# Patient Record
Sex: MALE | Race: WHITE | NOT HISPANIC OR LATINO | Employment: OTHER | URBAN - METROPOLITAN AREA
[De-identification: names, ages, dates, MRNs, and addresses within clinical notes are randomized per-mention and may not be internally consistent; named-entity substitution may affect disease eponyms.]

---

## 2017-11-20 ENCOUNTER — ESTABLISHED COMPREHENSIVE EXAM (OUTPATIENT)
Dept: URBAN - METROPOLITAN AREA CLINIC 43 | Facility: CLINIC | Age: 72
End: 2017-11-20

## 2017-11-20 DIAGNOSIS — E11.9: ICD-10-CM

## 2017-11-20 DIAGNOSIS — H44.22: ICD-10-CM

## 2017-11-20 DIAGNOSIS — H52.203: ICD-10-CM

## 2017-11-20 DIAGNOSIS — H52.11: ICD-10-CM

## 2017-11-20 PROCEDURE — 92014 COMPRE OPH EXAM EST PT 1/>: CPT

## 2017-11-20 PROCEDURE — 92310-1 LEVEL 1 CONTACT LENS MANAGEMENT

## 2017-11-20 PROCEDURE — 92015 DETERMINE REFRACTIVE STATE: CPT

## 2017-11-20 ASSESSMENT — VISUAL ACUITY
OS_SC: J1+
OD_CC: 20/40-1
OS_CC: 20/50-2
OD_CC: J1+
OS_CC: J1
OD_SC: J1+

## 2017-11-20 ASSESSMENT — TONOMETRY
OS_IOP_MMHG: 16
OD_IOP_MMHG: 17

## 2019-01-11 ENCOUNTER — ESTABLISHED COMPREHENSIVE EXAM (OUTPATIENT)
Dept: URBAN - METROPOLITAN AREA CLINIC 43 | Facility: CLINIC | Age: 74
End: 2019-01-11

## 2019-01-11 DIAGNOSIS — Z46.0: ICD-10-CM

## 2019-01-11 DIAGNOSIS — H52.203: ICD-10-CM

## 2019-01-11 DIAGNOSIS — E11.9: ICD-10-CM

## 2019-01-11 DIAGNOSIS — H52.12: ICD-10-CM

## 2019-01-11 DIAGNOSIS — H52.11: ICD-10-CM

## 2019-01-11 PROCEDURE — 92015 DETERMINE REFRACTIVE STATE: CPT

## 2019-01-11 PROCEDURE — 92014 COMPRE OPH EXAM EST PT 1/>: CPT

## 2019-01-11 PROCEDURE — 92310-2 LEVEL 2 CONTACT LENS MANAGEMENT

## 2019-01-11 ASSESSMENT — VISUAL ACUITY
OS_BAT: 20/400
OD_CC: 20/50-2
OD_CC: J1
OS_SC: J12
OS_CC: 20/50-1
OD_SC: J8
OD_BAT: 20/400
OS_CC: J3

## 2019-02-12 ENCOUNTER — CATARACT CONSULT (OUTPATIENT)
Dept: URBAN - METROPOLITAN AREA CLINIC 43 | Facility: CLINIC | Age: 74
End: 2019-02-12

## 2019-02-12 VITALS
HEART RATE: 84 BPM | DIASTOLIC BLOOD PRESSURE: 76 MMHG | HEIGHT: 60 IN | RESPIRATION RATE: 14 BRPM | SYSTOLIC BLOOD PRESSURE: 142 MMHG

## 2019-02-12 DIAGNOSIS — H43.813: ICD-10-CM

## 2019-02-12 DIAGNOSIS — H35.372: ICD-10-CM

## 2019-02-12 DIAGNOSIS — H25.811: ICD-10-CM

## 2019-02-12 DIAGNOSIS — H25.812: ICD-10-CM

## 2019-02-12 DIAGNOSIS — E11.9: ICD-10-CM

## 2019-02-12 PROCEDURE — 92136TC INTERFEROMETRY - TECHNICAL COMPONENT

## 2019-02-12 PROCEDURE — 99214 OFFICE O/P EST MOD 30 MIN: CPT

## 2019-02-12 PROCEDURE — 92134 CPTRZ OPH DX IMG PST SGM RTA: CPT

## 2019-02-12 PROCEDURE — 92025-2 CORNEAL TOPOGRAPHY, PT

## 2019-02-12 PROCEDURE — V2799I IMPRIMIS

## 2019-02-12 ASSESSMENT — VISUAL ACUITY
OS_AM: 20/20-1
OS_SC: CF 6FT
OD_SC: CF 6FT
OD_PAM: 20/20-2
OS_CC: J1
OD_CC: J1
OS_CC: 20/50
OD_SC: J1
OD_CC: 20/40-2
OD_BAT: 20/400
OS_BAT: 20/400
OS_SC: J1

## 2019-02-12 ASSESSMENT — TONOMETRY
OS_IOP_MMHG: 12
OD_IOP_MMHG: 12

## 2019-03-11 ENCOUNTER — SURGERY/PROCEDURE (OUTPATIENT)
Dept: URBAN - METROPOLITAN AREA CLINIC 43 | Facility: CLINIC | Age: 74
End: 2019-03-11

## 2019-03-11 ENCOUNTER — PRE-OP/H&P (OUTPATIENT)
Dept: URBAN - METROPOLITAN AREA CLINIC 39 | Facility: CLINIC | Age: 74
End: 2019-03-11

## 2019-03-11 VITALS
HEIGHT: 60 IN | SYSTOLIC BLOOD PRESSURE: 142 MMHG | DIASTOLIC BLOOD PRESSURE: 76 MMHG | HEART RATE: 84 BPM | RESPIRATION RATE: 14 BRPM

## 2019-03-11 DIAGNOSIS — H25.811: ICD-10-CM

## 2019-03-11 DIAGNOSIS — H35.372: ICD-10-CM

## 2019-03-11 DIAGNOSIS — H02.831: ICD-10-CM

## 2019-03-11 DIAGNOSIS — H25.812: ICD-10-CM

## 2019-03-11 DIAGNOSIS — H02.834: ICD-10-CM

## 2019-03-11 DIAGNOSIS — H43.813: ICD-10-CM

## 2019-03-11 DIAGNOSIS — E11.9: ICD-10-CM

## 2019-03-11 PROCEDURE — 66984CV REMOVE CATARACT, INSERT LENS, CUSTOM VISION

## 2019-03-11 PROCEDURE — 66999LNSR LENSAR LASER FOR CAT SX

## 2019-03-11 PROCEDURE — 99211T TECH SERVICE

## 2019-03-11 PROCEDURE — 65772LRI LRI DURING CAT SX

## 2019-03-12 ENCOUNTER — POST OP/EVAL OF SECOND EYE (OUTPATIENT)
Dept: URBAN - METROPOLITAN AREA CLINIC 43 | Facility: CLINIC | Age: 74
End: 2019-03-12

## 2019-03-12 DIAGNOSIS — H25.812: ICD-10-CM

## 2019-03-12 DIAGNOSIS — H35.372: ICD-10-CM

## 2019-03-12 DIAGNOSIS — Z96.1: ICD-10-CM

## 2019-03-12 PROCEDURE — 92012 INTRM OPH EXAM EST PATIENT: CPT

## 2019-03-12 PROCEDURE — 99024 POSTOP FOLLOW-UP VISIT: CPT

## 2019-03-12 ASSESSMENT — VISUAL ACUITY
OS_CC: J1
OS_SC: J1
OD_SC: 20/30-1
OS_SC: CF 6FT
OS_CC: 20/40-2
OS_BAT: 20/400

## 2019-03-12 ASSESSMENT — TONOMETRY
OD_IOP_MMHG: 17
OS_IOP_MMHG: 15

## 2019-03-13 ENCOUNTER — SURGERY/PROCEDURE (OUTPATIENT)
Dept: URBAN - METROPOLITAN AREA CLINIC 43 | Facility: CLINIC | Age: 74
End: 2019-03-13

## 2019-03-13 ENCOUNTER — PRE-OP/H&P (OUTPATIENT)
Dept: URBAN - METROPOLITAN AREA CLINIC 39 | Facility: CLINIC | Age: 74
End: 2019-03-13

## 2019-03-13 VITALS
HEIGHT: 60 IN | HEART RATE: 84 BPM | RESPIRATION RATE: 14 BRPM | DIASTOLIC BLOOD PRESSURE: 76 MMHG | SYSTOLIC BLOOD PRESSURE: 142 MMHG

## 2019-03-13 DIAGNOSIS — H25.812: ICD-10-CM

## 2019-03-13 DIAGNOSIS — Z96.1: ICD-10-CM

## 2019-03-13 PROCEDURE — 65772LRI LRI DURING CAT SX

## 2019-03-13 PROCEDURE — 66984CV REMOVE CATARACT, INSERT LENS, CUSTOM VISION

## 2019-03-13 PROCEDURE — 99211T TECH SERVICE

## 2019-03-13 PROCEDURE — 66999LNSR LENSAR LASER FOR CAT SX

## 2019-03-13 ASSESSMENT — VISUAL ACUITY
OD_SC: J4
OS_CC: J1
OS_CC: 20/50
OS_BAT: 20/400
OD_SC: 20/25-2
OS_SC: J1

## 2019-03-13 ASSESSMENT — TONOMETRY
OD_IOP_MMHG: 16
OS_IOP_MMHG: 16

## 2019-03-14 ENCOUNTER — CATARACT POST-OP 1-DAY (OUTPATIENT)
Dept: URBAN - METROPOLITAN AREA CLINIC 43 | Facility: CLINIC | Age: 74
End: 2019-03-14

## 2019-03-14 DIAGNOSIS — Z96.1: ICD-10-CM

## 2019-03-14 PROCEDURE — 99024 POSTOP FOLLOW-UP VISIT: CPT

## 2019-03-14 ASSESSMENT — VISUAL ACUITY
OS_SC: J1
OD_SC: 20/25-2
OS_SC: 20/80

## 2019-03-14 ASSESSMENT — TONOMETRY
OS_IOP_MMHG: 15
OD_IOP_MMHG: 13

## 2019-04-02 ENCOUNTER — REFRACTION ONLY (OUTPATIENT)
Dept: URBAN - METROPOLITAN AREA CLINIC 43 | Facility: CLINIC | Age: 74
End: 2019-04-02

## 2019-04-02 DIAGNOSIS — Z96.1: ICD-10-CM

## 2019-04-02 PROCEDURE — 99024 POSTOP FOLLOW-UP VISIT: CPT

## 2019-04-02 ASSESSMENT — TONOMETRY
OD_IOP_MMHG: 13
OS_IOP_MMHG: 14

## 2019-04-02 ASSESSMENT — VISUAL ACUITY
OD_SC: 20/20-2
OS_SC: 20/80-1
OS_SC: J1

## 2019-10-21 ENCOUNTER — ESTABLISHED COMPREHENSIVE EXAM (OUTPATIENT)
Dept: URBAN - METROPOLITAN AREA CLINIC 43 | Facility: CLINIC | Age: 74
End: 2019-10-21

## 2019-10-21 DIAGNOSIS — H52.4: ICD-10-CM

## 2019-10-21 DIAGNOSIS — H52.203: ICD-10-CM

## 2019-10-21 DIAGNOSIS — E11.9: ICD-10-CM

## 2019-10-21 DIAGNOSIS — H52.13: ICD-10-CM

## 2019-10-21 PROCEDURE — 92014 COMPRE OPH EXAM EST PT 1/>: CPT

## 2019-10-21 PROCEDURE — 92015 DETERMINE REFRACTIVE STATE: CPT

## 2019-10-21 ASSESSMENT — VISUAL ACUITY
OS_SC: 20/100
OD_SC: 20/40-1
OD_SC: J6-
OS_SC: J1
OS_BAT: <20/400 W/ MR

## 2019-10-21 ASSESSMENT — TONOMETRY
OD_IOP_MMHG: 14
OS_IOP_MMHG: 13

## 2020-10-23 ENCOUNTER — ESTABLISHED COMPREHENSIVE EXAM (OUTPATIENT)
Dept: URBAN - METROPOLITAN AREA CLINIC 43 | Facility: CLINIC | Age: 75
End: 2020-10-23

## 2020-10-23 DIAGNOSIS — H52.13: ICD-10-CM

## 2020-10-23 DIAGNOSIS — H52.203: ICD-10-CM

## 2020-10-23 DIAGNOSIS — H52.4: ICD-10-CM

## 2020-10-23 PROCEDURE — 92014 COMPRE OPH EXAM EST PT 1/>: CPT

## 2020-10-23 PROCEDURE — 92015 DETERMINE REFRACTIVE STATE: CPT

## 2020-10-23 ASSESSMENT — TONOMETRY
OS_IOP_MMHG: 15
OD_IOP_MMHG: 14

## 2020-10-23 ASSESSMENT — VISUAL ACUITY
OS_SC: 20/200
OD_SC: 20/25-2
OD_SC: J4
OS_SC: J1

## 2021-10-26 ENCOUNTER — ESTABLISHED COMPREHENSIVE EXAM (OUTPATIENT)
Dept: URBAN - METROPOLITAN AREA CLINIC 43 | Facility: CLINIC | Age: 76
End: 2021-10-26

## 2021-10-26 DIAGNOSIS — H52.12: ICD-10-CM

## 2021-10-26 DIAGNOSIS — H52.4: ICD-10-CM

## 2021-10-26 DIAGNOSIS — H52.203: ICD-10-CM

## 2021-10-26 PROCEDURE — 92014 COMPRE OPH EXAM EST PT 1/>: CPT

## 2021-10-26 PROCEDURE — 92015 DETERMINE REFRACTIVE STATE: CPT

## 2021-10-26 ASSESSMENT — VISUAL ACUITY
OD_SC: J4
OD_SC: 20/30-2
OS_BAT: 20/400
OD_BAT: 20/100-1
OS_SC: J1
OS_SC: 20/100

## 2021-10-26 ASSESSMENT — TONOMETRY
OS_IOP_MMHG: 14
OD_IOP_MMHG: 12

## 2021-12-30 NOTE — PATIENT DISCUSSION
We've been undercorrecting her ctls to build in help for near and she likes it.  She supplements with otc readers prn.  No changes today.

## 2022-12-02 ENCOUNTER — COMPREHENSIVE EXAM (OUTPATIENT)
Dept: URBAN - METROPOLITAN AREA CLINIC 43 | Facility: CLINIC | Age: 77
End: 2022-12-02

## 2022-12-02 PROCEDURE — 92014 COMPRE OPH EXAM EST PT 1/>: CPT

## 2022-12-02 PROCEDURE — 92015 DETERMINE REFRACTIVE STATE: CPT

## 2022-12-02 ASSESSMENT — VISUAL ACUITY
OS_SC: J1
OD_SC: 20/40+1
OD_SC: J4-
OS_SC: 20/100

## 2022-12-02 ASSESSMENT — TONOMETRY
OS_IOP_MMHG: 10
OD_IOP_MMHG: 11

## 2022-12-20 NOTE — PATIENT DISCUSSION
strong suspicion of diabetes today due to large myopic shift in MR and increase in cataracts. Pt does not regularly see a PCP.

## 2023-01-31 NOTE — PATIENT DISCUSSION
IOP wnl today. Patient understands condition, prognosis and need for follow up care with Nadya [facility] 446.

## 2023-01-31 NOTE — PATIENT DISCUSSION
Pt qualifies up to CV Padmini OU vs SCOTT Padmini OD, -075 OS (Moderate dilator). **NO ADV DUE TO DIABETIC RETINOPATHY** Pt leaning towards CV Padmini vs SCOTT Padmini OD, -0.75 OS +/- pupil stretch, +Cyclo.

## 2023-01-31 NOTE — PATIENT DISCUSSION
Discussed the importance of blood sugar control in the prevention of ocular complications. Not a candidate for advanced.

## 2023-01-31 NOTE — PATIENT DISCUSSION
Pt qualifies up to CV Padmini OU vs SCOTT Padmini OD, -075 OS (Moderate dilator). **NO ADV DUE TO DIABETIC RETINOPATHY** Pt leaning towards CV Padmini vs SCOTT Padmini OD, -0.75 OS +/- pupil stretch, +Cyclo. OD 1ST.

## 2023-11-13 ENCOUNTER — COMPREHENSIVE EXAM (OUTPATIENT)
Dept: URBAN - METROPOLITAN AREA CLINIC 43 | Facility: CLINIC | Age: 78
End: 2023-11-13

## 2023-11-13 DIAGNOSIS — H52.4: ICD-10-CM

## 2023-11-13 DIAGNOSIS — H35.372: ICD-10-CM

## 2023-11-13 DIAGNOSIS — H52.203: ICD-10-CM

## 2023-11-13 DIAGNOSIS — H43.813: ICD-10-CM

## 2023-11-13 DIAGNOSIS — Z96.1: ICD-10-CM

## 2023-11-13 DIAGNOSIS — H52.12: ICD-10-CM

## 2023-11-13 PROCEDURE — 92014 COMPRE OPH EXAM EST PT 1/>: CPT

## 2023-11-13 PROCEDURE — 92015 DETERMINE REFRACTIVE STATE: CPT

## 2023-11-13 ASSESSMENT — VISUAL ACUITY
OS_SC: J1
OD_SC: 20/20-1
OD_CC: 20/25-1
OS_CC: 20/20
OD_SC: J3
OS_SC: 20/60+2

## 2024-12-04 ENCOUNTER — COMPREHENSIVE EXAM (OUTPATIENT)
Age: 79
End: 2024-12-04

## 2024-12-04 DIAGNOSIS — H52.12: ICD-10-CM

## 2024-12-04 DIAGNOSIS — H52.203: ICD-10-CM

## 2024-12-04 DIAGNOSIS — H43.813: ICD-10-CM

## 2024-12-04 DIAGNOSIS — H26.493: ICD-10-CM

## 2024-12-04 DIAGNOSIS — H52.4: ICD-10-CM

## 2024-12-04 DIAGNOSIS — H35.372: ICD-10-CM

## 2024-12-04 PROCEDURE — 92014 COMPRE OPH EXAM EST PT 1/>: CPT

## 2024-12-04 PROCEDURE — 92015 DETERMINE REFRACTIVE STATE: CPT
